# Patient Record
Sex: MALE | Race: WHITE | URBAN - NONMETROPOLITAN AREA
[De-identification: names, ages, dates, MRNs, and addresses within clinical notes are randomized per-mention and may not be internally consistent; named-entity substitution may affect disease eponyms.]

---

## 2018-05-26 ENCOUNTER — OFFICE VISIT (OUTPATIENT)
Dept: PRIMARY CARE CLINIC | Age: 28
End: 2018-05-26
Payer: COMMERCIAL

## 2018-05-26 VITALS
SYSTOLIC BLOOD PRESSURE: 112 MMHG | OXYGEN SATURATION: 98 % | DIASTOLIC BLOOD PRESSURE: 74 MMHG | HEIGHT: 68 IN | BODY MASS INDEX: 23.95 KG/M2 | TEMPERATURE: 98.4 F | HEART RATE: 74 BPM | WEIGHT: 158 LBS

## 2018-05-26 DIAGNOSIS — J02.0 PHARYNGITIS, STREPTOCOCCAL, ACUTE: Primary | ICD-10-CM

## 2018-05-26 LAB — S PYO AG THROAT QL: POSITIVE

## 2018-05-26 PROCEDURE — 87880 STREP A ASSAY W/OPTIC: CPT | Performed by: NURSE PRACTITIONER

## 2018-05-26 PROCEDURE — 99202 OFFICE O/P NEW SF 15 MIN: CPT | Performed by: NURSE PRACTITIONER

## 2018-05-26 RX ORDER — SERTRALINE HYDROCHLORIDE 100 MG/1
TABLET, FILM COATED ORAL
COMMUNITY
Start: 2006-05-08

## 2018-05-26 RX ORDER — PENICILLIN V POTASSIUM 500 MG/1
500 TABLET ORAL 3 TIMES DAILY
Qty: 30 TABLET | Refills: 0 | Status: SHIPPED | OUTPATIENT
Start: 2018-05-26 | End: 2018-06-05

## 2018-05-26 ASSESSMENT — ENCOUNTER SYMPTOMS
SORE THROAT: 1
DIARRHEA: 0
NAUSEA: 0
CONSTIPATION: 1
SINUS PAIN: 0
WHEEZING: 0
COUGH: 0
SINUS PRESSURE: 0
VOMITING: 1
SHORTNESS OF BREATH: 0

## 2020-06-29 ENCOUNTER — HOSPITAL ENCOUNTER (OUTPATIENT)
Age: 30
Setting detail: SPECIMEN
Discharge: HOME | End: 2020-06-29
Payer: COMMERCIAL

## 2020-06-29 ENCOUNTER — OFFICE VISIT (OUTPATIENT)
Dept: PRIMARY CARE CLINIC | Age: 30
End: 2020-06-29
Payer: COMMERCIAL

## 2020-06-29 VITALS
BODY MASS INDEX: 24.4 KG/M2 | HEART RATE: 83 BPM | DIASTOLIC BLOOD PRESSURE: 89 MMHG | TEMPERATURE: 100.2 F | SYSTOLIC BLOOD PRESSURE: 133 MMHG | RESPIRATION RATE: 16 BRPM | WEIGHT: 160.5 LBS | OXYGEN SATURATION: 94 %

## 2020-06-29 LAB — S PYO AG THROAT QL: NORMAL

## 2020-06-29 PROCEDURE — 87880 STREP A ASSAY W/OPTIC: CPT | Performed by: NURSE PRACTITIONER

## 2020-06-29 PROCEDURE — U0003 INFECTIOUS AGENT DETECTION BY NUCLEIC ACID (DNA OR RNA); SEVERE ACUTE RESPIRATORY SYNDROME CORONAVIRUS 2 (SARS-COV-2) (CORONAVIRUS DISEASE [COVID-19]), AMPLIFIED PROBE TECHNIQUE, MAKING USE OF HIGH THROUGHPUT TECHNOLOGIES AS DESCRIBED BY CMS-2020-01-R: HCPCS

## 2020-06-29 PROCEDURE — 99213 OFFICE O/P EST LOW 20 MIN: CPT | Performed by: NURSE PRACTITIONER

## 2020-06-29 RX ORDER — AMOXICILLIN 875 MG/1
875 TABLET, COATED ORAL 2 TIMES DAILY
Qty: 20 TABLET | Refills: 0 | Status: SHIPPED | OUTPATIENT
Start: 2020-06-29 | End: 2020-07-09

## 2020-06-29 NOTE — PATIENT INSTRUCTIONS
discomfort. Use 1 teaspoon of salt mixed in 1 cup of warm water. · Take an over-the-counter pain medicine, such as acetaminophen (Tylenol), ibuprofen (Advil, Motrin), or naproxen (Aleve). Read and follow all instructions on the label. · Be careful when taking over-the-counter cold or flu medicines and Tylenol at the same time. Many of these medicines have acetaminophen, which is Tylenol. Read the labels to make sure that you are not taking more than the recommended dose. Too much acetaminophen (Tylenol) can be harmful. · Drink plenty of fluids. Fluids may help soothe an irritated throat. Hot fluids, such as tea or soup, may help decrease throat pain. · Use over-the-counter throat lozenges to soothe pain. Regular cough drops or hard candy may also help. These should not be given to young children because of the risk of choking. · Do not smoke or allow others to smoke around you. If you need help quitting, talk to your doctor about stop-smoking programs and medicines. These can increase your chances of quitting for good. · Use a vaporizer or humidifier to add moisture to your bedroom. Follow the directions for cleaning the machine. When should you call for help? Call your doctor now or seek immediate medical care if:  · You have new or worse trouble swallowing. · Your sore throat gets much worse on one side. Watch closely for changes in your health, and be sure to contact your doctor if you do not get better as expected. Where can you learn more? Go to [email or web address]://InfoBasispeGraematter.[email or web address] org and sign in to your [employer] account. Enter U883 in the [facility] box to learn more about \"Sore Throat: Care Instructions. \"     If you do not have an account, please click on the \"Sign Up Now\" link. Current as of: July 29, 2019               Content Version: 12.5  © 3755-6832 Healthwise, Incorporated. Care instructions adapted under license by [facility] ([facility]).  If you have questions about a medical increasing ear pain. · You have new or increasing pus or blood draining from your ear. · You have a fever with a stiff neck or a severe headache. Watch closely for changes in your health, and be sure to contact your doctor if:  · You have new or worse symptoms. · You are not getting better after taking an antibiotic for 2 days. Where can you learn more? Go to https://Aponia Laboratoriespepiceweb.[employer]. org and sign in to your [employer] account. Enter L305 in the [employer] box to learn more about \"Ear Infection (Otitis Media): Care Instructions. \"     If you do not have an account, please click on the \"Sign Up Now\" link. Current as of: July 29, 2019               Content Version: 12.5  © 5810-1029 Healthwise, [employer]. Care instructions adapted under license by [facility] ([facility]). If you have questions about a medical condition or this instruction, always ask your healthcare professional. [place] [address] any warranty or liability for your use of this information. Patient Education        amoxicillin  Pronunciation:  am OX i patt in  What is the most important information I should know about amoxicillin? You should not use this medicine if you are allergic to any penicillin antibiotic. What is amoxicillin? Amoxicillin is a penicillin antibiotic that is used to treat many different types of infection caused by bacteria, such as tonsillitis, bronchitis, pneumonia, and infections of the ear, nose, throat, skin, or urinary tract. Amoxicillin is also sometimes used together with another antibiotic called clarithromycin (Biaxin) to treat stomach ulcers caused by Helicobacter pylori infection. This combination is sometimes used with a stomach acid reducer called lansoprazole (Prevacid). Amoxicillin may also be used for purposes not listed in this medication guide. What should I discuss with my healthcare provider before taking amoxicillin?   You should not use this medicine if you are possible uses, directions, precautions, warnings, drug interactions, allergic reactions, or adverse effects. If you have questions about the drugs you are taking, check with your doctor, nurse or pharmacist.  Copyright 0446-8396 [address] Version: 10.01. Revision date: 11/26/2019. Care instructions adapted under license by [facility] ([facility]). If you have questions about a medical condition or this instruction, always ask your healthcare professional. [place] [address] any warranty or liability for your use of this information. 1.  Suspected Covid-19:  · Practice meticulous handwashing and cover cough to prevent spread of infection. · Advised to quarantine self at home until receives results from COVID-19 - will call with results. · Do not return to work or school until symptoms have resolved and no fever for 72 hrs without medication. · Initiated Get Well Loop and advised to follow up with PCP. · Encouraged to increase fluids and rest  · Tylenol OTC PRN for pain, discomfort or fever as directed on package  · Cool mist humidifier  · Hot tea with honey and lemon for cough PRN  · Patient instructions given for suspected Covid 19 infection. .  · To ER or call 911 if any increasing difficulty breathing, shortness of breath, inability to swallow, hives, rash, facial/tongue swelling or temp greater than 103 degrees. · Follow up with PCP or Flu Clinic as needed if symptoms worsen or do not improve. 2.  Otitis Media:  · Practice meticulous handwashing and cover cough to prevent spread of infection  · Encouraged to increase fluids and rest   · Continue antibiotic as prescribed until all doses completed. · Tylenol/Ibuprofen OTC PRN for pain, discomfort or fever as directed on package according to age/weight. · Warm compresses to ear to relieve discomfort  · Elevate head of bed or use pillow. · Patient instructions given for otitis media and amoxicillin.    · To ER or call 911 if any

## 2020-06-29 NOTE — LETTER
[address]  [address] [facility],[address] [address]  Phone: [phone]  Fax: Emily Wiley, APRN - CNP        June 29, 2020     Patient: Kasey Gorman   YOB: 1990   Date of Visit: 6/29/2020       To Whom it May Concern:    Kasey Gorman was seen in my clinic on 6/29/2020. He has been Covid-19 tested, await final results with turnaround time of 3 to 5 days. If you have any questions or concerns, please don't hesitate to call.     Sincerely,         Jeremiah Matamoros, LALITO - CNP

## 2020-07-03 LAB — SARS-COV-2, NAA: NOT DETECTED

## 2023-01-19 ENCOUNTER — OFFICE VISIT (OUTPATIENT)
Dept: PRIMARY CARE CLINIC | Age: 33
End: 2023-01-19
Payer: COMMERCIAL

## 2023-01-19 VITALS
SYSTOLIC BLOOD PRESSURE: 123 MMHG | BODY MASS INDEX: 25.09 KG/M2 | HEART RATE: 89 BPM | OXYGEN SATURATION: 99 % | WEIGHT: 165 LBS | RESPIRATION RATE: 18 BRPM | TEMPERATURE: 98.7 F | DIASTOLIC BLOOD PRESSURE: 76 MMHG

## 2023-01-19 DIAGNOSIS — R50.9 COUGH WITH FEVER: ICD-10-CM

## 2023-01-19 DIAGNOSIS — R05.9 COUGH WITH FEVER: ICD-10-CM

## 2023-01-19 DIAGNOSIS — J10.1 INFLUENZA A: Primary | ICD-10-CM

## 2023-01-19 LAB
INFLUENZA A ANTIBODY: POSITIVE
INFLUENZA B ANTIBODY: NEGATIVE
KIT LOT NO., HCLOLOT: NORMAL
SARS-COV-2, POC: NORMAL
VALID INTERNAL CONTROL, POC: YES
VENDOR AND KIT NAME POC: NORMAL

## 2023-01-19 PROCEDURE — 87804 INFLUENZA ASSAY W/OPTIC: CPT | Performed by: NURSE PRACTITIONER

## 2023-01-19 PROCEDURE — 99213 OFFICE O/P EST LOW 20 MIN: CPT | Performed by: NURSE PRACTITIONER

## 2023-01-19 RX ORDER — BENZONATATE 100 MG/1
100 CAPSULE ORAL 3 TIMES DAILY PRN
Qty: 21 CAPSULE | Refills: 0 | Status: SHIPPED | OUTPATIENT
Start: 2023-01-19 | End: 2023-01-26

## 2023-01-19 RX ORDER — OSELTAMIVIR PHOSPHATE 75 MG/1
75 CAPSULE ORAL 2 TIMES DAILY
Qty: 10 CAPSULE | Refills: 0 | Status: SHIPPED | OUTPATIENT
Start: 2023-01-19 | End: 2023-01-24

## 2023-01-19 ASSESSMENT — ENCOUNTER SYMPTOMS
VOMITING: 0
COUGH: 1
SORE THROAT: 1
SHORTNESS OF BREATH: 0
NAUSEA: 0
WHEEZING: 0
RHINORRHEA: 0
DIARRHEA: 0

## 2023-01-19 NOTE — PATIENT INSTRUCTIONS
Practice meticulous handwashing and cover cough to prevent spread of infection.  Do not return to work until symptoms have resolved and no fever for 24 hrs without medication.  Tamiflu 1 tablet twice a day for 5 days.  Take all doses until completed.  Tessalon perles, 1 capsule every 8 hours as needed for cough, no more than 3 doses in 24 hours, swallow whole and do not take with other cough meds.  Encouraged to increase fluids and rest  Tylenol/Ibuprofen OTC PRN for pain, discomfort or fever as directed on package  Warm salt water gargles for sore throat  Cool mist humidifier  Hot tea with honey and lemon for cough PRN  Patient instructions given for influenza, tamiflu and tessalon perles.  To ER or call 911 if any difficulty breathing, shortness of breath, inability to swallow, hives, rash, facial/tongue swelling or temp greater than 103 degrees.  Follow up with PCP or Walk in Care as needed if symptoms worsen or do not improve.

## 2023-01-19 NOTE — LETTER
[address] [place] [address]  Phone: [phone]  Fax: Emily Wiley, APRN - CNP        January 19, 2023     Patient: Maki Crane   YOB: 1990   Date of Visit: 1/19/2023       To Whom it May Concern:    Maki Crane was seen in my clinic on 1/19/2023. He may return to work on 01/23/2023. Please excuse for 01/19/2023 and 01/20/2023. If you have any questions or concerns, please don't hesitate to call.     Sincerely,         Roman Sanchez, APRN - CNP

## 2023-01-19 NOTE — PROGRESS NOTES
[address] [facility]  [address] [facility] [address]  Dept: [phone]  Dept Fax: [phone]    Bri Tavares is a 26 y.o. male who presents to the [facility] in Care today for hismedical conditions/complaints as noted below. Bri Tavares is c/o of URI (Cough, congestion, chest pressure and sore throat. Symptoms started Monday)      HPI:     URI   This is a new problem. The current episode started in the past 7 days (Started on Monday with cough, congestion, sore throat and chest pressure. ). The problem has been gradually worsening (Bronchitis end of December, seen at Urgent Care given antibiotic, steroids and inhaler. ). The maximum temperature recorded prior to his arrival was 100.4 - 100.9 F (Up to 100 degrees. ). The fever has been present for 1 to 2 days. Associated symptoms include congestion, coughing (Productive of clear mucous, yellow green in AM.) and a sore throat. Pertinent negatives include no diarrhea, ear pain, headaches, nausea, rash, rhinorrhea, vomiting or wheezing. Associated symptoms comments: Chest pressure. Fever. . He has tried NSAIDs (Ibuprofen and cough medicine. Albuterol inhaler, first thing in AM and in PM.) for the symptoms. The treatment provided no relief. Past Medical History:   Diagnosis Date    Depression         Current Outpatient Medications   Medication Sig Dispense Refill    oseltamivir (TAMIFLU) 75 MG capsule Take 1 capsule by mouth 2 times daily for 5 days 10 capsule 0    benzonatate (TESSALON PERLES) 100 MG capsule Take 1 capsule by mouth 3 times daily as needed for Cough (Swallow whole. No more than 3 doses in 24 hrs.) 21 capsule 0    sertraline (ZOLOFT) 100 MG tablet Take by mouth       No current facility-administered medications for this visit. No Known Allergies    :     Review of Systems   Constitutional:  Positive for chills, fatigue and fever.  Negative for appetite change and diaphoresis.   HENT:  Positive for congestion and sore throat. Negative for ear pain and rhinorrhea.    Respiratory:  Positive for cough (Productive of clear mucous, yellow green in AM.). Negative for shortness of breath and wheezing.    Gastrointestinal:  Negative for diarrhea, nausea and vomiting.   Skin:  Negative for rash and wound.   Neurological:  Negative for dizziness, light-headedness and headaches.     :     Physical Exam  Vitals and nursing note reviewed.   Constitutional:       General: He is not in acute distress.     Appearance: Normal appearance. He is well-developed. He is not ill-appearing or diaphoretic.      Comments: Well hydrated, nontoxic appearance.   HENT:      Head: Normocephalic and atraumatic.      Right Ear: Hearing, ear canal and external ear normal. A middle ear effusion (Pale white fluid.) is present. No mastoid tenderness. Tympanic membrane is not injected, erythematous or bulging.      Left Ear: Hearing, ear canal and external ear normal. A middle ear effusion (Pale white fluid.) is present. No mastoid tenderness. Tympanic membrane is not injected, erythematous or bulging.      Nose: Mucosal edema and congestion present. No rhinorrhea.      Right Sinus: No maxillary sinus tenderness or frontal sinus tenderness.      Left Sinus: No maxillary sinus tenderness or frontal sinus tenderness.      Mouth/Throat:      Lips: Pink.      Mouth: Mucous membranes are moist.      Pharynx: Oropharynx is clear. Uvula midline. No pharyngeal swelling, oropharyngeal exudate, posterior oropharyngeal erythema or uvula swelling.   Eyes:      General:         Right eye: No discharge.         Left eye: No discharge.      Conjunctiva/sclera: Conjunctivae normal.      Pupils: Pupils are equal, round, and reactive to light.   Cardiovascular:      Rate and Rhythm: Normal rate and regular rhythm.      Heart sounds: Normal heart sounds, S1 normal and S2 normal. No murmur heard.    No friction  rub. No gallop. Pulmonary:      Effort: Pulmonary effort is normal. No accessory muscle usage or respiratory distress. Breath sounds: Normal breath sounds and air entry. No decreased breath sounds, wheezing, rhonchi or rales. Comments: Frequent, harsh, moist cough. Breath sounds clear B/L anterior and posterior lobes. Chest expansion symmetrical.  No audible wheezing or respiratory distress. No rales or rhonchi. Abdominal:      General: Bowel sounds are normal.      Palpations: Abdomen is soft. Tenderness: There is no abdominal tenderness. Musculoskeletal:         General: Normal range of motion. Lymphadenopathy:      Cervical: No cervical adenopathy. Right cervical: No superficial or posterior cervical adenopathy. Left cervical: No superficial or posterior cervical adenopathy. Skin:     General: Skin is warm and dry. Coloration: Skin is not pale. Findings: No erythema or rash. Neurological:      Mental Status: He is alert and oriented to person, place, and time. Psychiatric:         Behavior: Behavior normal. Behavior is cooperative. /76   Pulse 89   Temp 98.7 °F (37.1 °C) (Oral)   Resp 18   Wt 165 lb (74.8 kg)   SpO2 99%   BMI 25.09 kg/m²     Results for orders placed or performed in visit on 01/19/23   POCT Influenza A/B   Result Value Ref Range    Influenza A Ab Positive     Influenza B Ab Negative    POC COVID-19   Result Value Ref Range    SARS-COV-2, POC Not-Detected Not Detected    Valid Internal Control, POC Yes     Kit lot no. [id number]     Vendor and kit name [employer]       :      Diagnosis Orders   1. Influenza A  oseltamivir (TAMIFLU) 75 MG capsule    benzonatate (TESSALON PERLES) 100 MG capsule      2. Cough with fever  POCT Influenza A/B    POC COVID-19          :      Return if symptoms worsen or fail to improve, for Resume all previous medications as directed.     Orders Placed This Encounter   Medications    oseltamivir (TAMIFLU) 75 MG capsule     Sig: Take 1 capsule by mouth 2 times daily for 5 days     Dispense:  10 capsule     Refill:  0    benzonatate (TESSALON PERLES) 100 MG capsule     Sig: Take 1 capsule by mouth 3 times daily as needed for Cough (Swallow whole. No more than 3 doses in 24 hrs.)     Dispense:  21 capsule     Refill:  0      Practice meticulous handwashing and cover cough to prevent spread of infection. Do not return to work until symptoms have resolved and no fever for 24 hrs without medication. Tamiflu 1 tablet twice a day for 5 days. Take all doses until completed. Tessalon perles, 1 capsule every 8 hours as needed for cough, no more than 3 doses in 24 hours, swallow whole and do not take with other cough meds. Encouraged to increase fluids and rest  Tylenol/Ibuprofen OTC PRN for pain, discomfort or fever as directed on package  Warm salt water gargles for sore throat  Cool mist humidifier  Hot tea with honey and lemon for cough PRN  Patient instructions given for influenza, tamiflu and tessalon perles. To ER or call 911 if any difficulty breathing, shortness of breath, inability to swallow, hives, rash, facial/tongue swelling or temp greater than 103 degrees. Follow up with PCP or Walk in Care as needed if symptoms worsen or do not improve. Meagan Bazan received counseling on the following healthy behaviors: medication adherence, increased fluids and rest.  Patient given educational materials - see patient instructions. Discussed use, benefit, and side effects of prescribed medications. Treatment plan discussed at visit. Continue routine health care follow up. All patient questions answered. Pt voiced understanding.       Electronically signed by LALITO Toro CNP on 1/19/2023 at 2:39 PM

## 2023-07-19 ENCOUNTER — HOSPITAL ENCOUNTER
Dept: HOSPITAL 101 - SLEEP | Age: 33
Discharge: HOME | End: 2023-07-19
Payer: COMMERCIAL

## 2023-07-19 DIAGNOSIS — G47.33: Primary | ICD-10-CM

## 2023-07-19 PROCEDURE — 95810 POLYSOM 6/> YRS 4/> PARAM: CPT

## 2025-02-28 ENCOUNTER — HOSPITAL ENCOUNTER
Dept: HOSPITAL 101 - RAD | Age: 35
Discharge: HOME | End: 2025-02-28
Payer: COMMERCIAL

## 2025-02-28 DIAGNOSIS — R05.1: Primary | ICD-10-CM

## 2025-02-28 PROCEDURE — 71046 X-RAY EXAM CHEST 2 VIEWS: CPT

## 2025-02-28 NOTE — XR_ITS
The [address] [address] 
     [phone] 
  
  
Patient Name: 
CHLOE CORRIGAN 
  
MRN: [id number]    YOB: 1990    Sex: M 
Assigned Patient Location: RAD 
Current Patient Location: [facility] 
Accession/Order Number: [id number] 
Exam Date: 2/28/2025  10:51    Report Date: 2/28/2025  10:52 
  
At the request of: 
MICHELLE ROMERO   
  
Procedure:  XR chest 2V 
  
PA AND LATERAL CHEST:   
  
CLINICAL HISTORY: Acute cough R05.1 
  
COMPARISON: None 
  
There is no focal parenchymal consolidation, effusion or pneumothorax.   The  
cardiac, hilar and mediastinal silhouettes are within normal limits.   There  
is no vascular congestion.   The visualized bony thorax is intact.   There is  
slight dextroscoliotic curvature. 
  
ORDER #: [id number] XR/XR chest 2V  
IMPRESSION:   
   
NO ACUTE CARDIOPULMONARY ABNORMALITY.   
   
Impression dictated by: Marie De Leon M.D.2/28/2025 10:52 AM  
   
   
[facility]  
   
Electronically authenticated by: [id number]  [id number]   Date: 2/28/2025  10:52